# Patient Record
Sex: MALE | Race: ASIAN | NOT HISPANIC OR LATINO | URBAN - METROPOLITAN AREA
[De-identification: names, ages, dates, MRNs, and addresses within clinical notes are randomized per-mention and may not be internally consistent; named-entity substitution may affect disease eponyms.]

---

## 2017-05-16 ENCOUNTER — OFFICE VISIT - HEALTHEAST (OUTPATIENT)
Dept: FAMILY MEDICINE | Facility: CLINIC | Age: 42
End: 2017-05-16

## 2017-05-16 DIAGNOSIS — J02.0 STREP THROAT: ICD-10-CM

## 2017-05-16 DIAGNOSIS — H10.9 RIGHT CONJUNCTIVITIS: ICD-10-CM

## 2017-05-18 ENCOUNTER — OFFICE VISIT - HEALTHEAST (OUTPATIENT)
Dept: FAMILY MEDICINE | Facility: CLINIC | Age: 42
End: 2017-05-18

## 2017-05-18 DIAGNOSIS — H10.33 CONJUNCTIVITIS, ACUTE, BILATERAL: ICD-10-CM

## 2017-11-10 ENCOUNTER — OFFICE VISIT - HEALTHEAST (OUTPATIENT)
Dept: FAMILY MEDICINE | Facility: CLINIC | Age: 42
End: 2017-11-10

## 2017-11-10 DIAGNOSIS — J02.9 SORE THROAT: ICD-10-CM

## 2017-11-10 DIAGNOSIS — R50.9 FEVER: ICD-10-CM

## 2018-09-13 ENCOUNTER — AMBULATORY - HEALTHEAST (OUTPATIENT)
Dept: NURSING | Facility: CLINIC | Age: 43
End: 2018-09-13

## 2018-09-13 DIAGNOSIS — Z23 NEED FOR IMMUNIZATION AGAINST INFLUENZA: ICD-10-CM

## 2019-09-30 ENCOUNTER — OFFICE VISIT - HEALTHEAST (OUTPATIENT)
Dept: FAMILY MEDICINE | Facility: CLINIC | Age: 44
End: 2019-09-30

## 2019-09-30 DIAGNOSIS — R07.0 THROAT PAIN: ICD-10-CM

## 2019-09-30 DIAGNOSIS — J02.9 ACUTE VIRAL PHARYNGITIS: ICD-10-CM

## 2019-09-30 DIAGNOSIS — Z60.3 LANGUAGE BARRIER: ICD-10-CM

## 2019-09-30 DIAGNOSIS — Z75.8 LANGUAGE BARRIER: ICD-10-CM

## 2019-09-30 LAB — DEPRECATED S PYO AG THROAT QL EIA: NORMAL

## 2019-09-30 SDOH — SOCIAL STABILITY - SOCIAL INSECURITY: ACCULTURATION DIFFICULTY: Z60.3

## 2019-10-01 LAB — GROUP A STREP BY PCR: NORMAL

## 2021-05-31 VITALS — WEIGHT: 147.3 LBS

## 2021-05-31 VITALS — WEIGHT: 145.9 LBS

## 2021-05-31 VITALS — WEIGHT: 149.5 LBS

## 2021-06-03 VITALS
SYSTOLIC BLOOD PRESSURE: 117 MMHG | WEIGHT: 151.6 LBS | TEMPERATURE: 97.8 F | OXYGEN SATURATION: 98 % | HEART RATE: 71 BPM | RESPIRATION RATE: 16 BRPM | DIASTOLIC BLOOD PRESSURE: 78 MMHG

## 2021-06-10 NOTE — PROGRESS NOTES
ASSESSMENT:   1. Strep throat  Rapid Strep A Screen-Throat    amoxicillin (AMOXIL) 500 MG capsule   2. Right conjunctivitis  polymyxin B-trimethoprim (FOR POLYTRIM) 10,000 unit- 1 mg/mL Drop ophthalmic drops        PLAN:  Rx: amoxicillin for strep throat.   Change toothbrush in 24 hours.  Contagious for 24 hours after starting antibiotic.  May use tylenol or ibuprofen for fever/discomfort.    F/u with PCP if not improving in 3-5 days, sooner if worsening.       SUBJECTIVE:   Rodrigo Hudson is a 41 y.o. male presents today with 3 days complaint of cough.  Cough is occasionally productive of clear sputum.  He is coughing so hard his chest hurts. He also complains of rhinorrhea, headache, sore throat, low energy and appetite. On the first 2 days of the illness he had a fever up to 101.0.  States he feels his energy, appetite, and cough are improving today. He had a loose stool x 1 on the first day of this illness.   Right eye has been red since waking this morning. Sick contacts: entire family has similar symptoms. His son is being treated with amoxicillin though he is unsure what it is for.  Has tried Christel Frederick with some relief. He does not wear contact lens.     Denies chills, body aches, shortness of breath, vomiting, rash. No injury/trauma to the eye.  No eye drainage or mattering.     Visit conducted with use of indeni interpretor.      There is no problem list on file for this patient.      History   Smoking Status     Never Smoker   Smokeless Tobacco     Never Used       Current Medications:  No current outpatient prescriptions on file prior to visit.     No current facility-administered medications on file prior to visit.        Allergies:   No Known Allergies    OBJECTIVE:   Vitals:    05/16/17 1659   BP: 116/60   Pulse: 87   Resp: 20   Temp: 98.6  F (37  C)   TempSrc: Oral   SpO2: 96%   Weight: 149 lb 8 oz (67.8 kg)     Physical exam reveals a pleasant 41 y.o. male.   Appears healthy, alert, cooperative and  in NAD.  Eyes:  Right conjunctiva mildly erythematous. No drainage or mattering of lashes. Left conjunctiva clear.  PERRLA. EOMs intact. Lids without erythema or edema.   Ears:  normal TMs bilaterally  Nose:    Mucosa normal. Clear rhinorrhea  Mouth: Oral mucosa pink and moist, moderate erythema of tonsils. 2+ tonsillar hypertrophy. No exudate or palatal petechiae. Uvula midline.    Lymph: small anterior cervical LAD  Lungs: Chest is clear, no wheezing, rhonchi or rales. Symmetric air entry throughout both lung fields.  Heart: regular rate and rhythm, no murmur, rub or gallop  Skin: pink, warm, dry, and without lesions on limited skin exam.       Recent Results (from the past 24 hour(s))   Rapid Strep A Screen-Throat   Result Value Ref Range    Rapid Strep A Antigen Group A Strep detected (!) No Group A Strep detected, presumptive negative

## 2021-06-10 NOTE — PROGRESS NOTES
Subjective:      Patient ID: Rodrigo Hudson is a 41 y.o. male.    Chief Complaint:    HPI  Patient comes in for evaluation of red itchy eyes for the last 2 days.  He had come into walk-in care clinic and was given medication for strep pharyngitis and Polytrim for conjunctivitis.  He has been using the eyedrops 4 times a day for the last 2 days and feels like his eyes are getting worse.  They are more red, swollen and itchy.  He has not noticed any vision changes.  He is currently taking amoxicillin for    His strep pharyngitis.    No past medical history on file.    No past surgical history on file.    No family history on file.    Social History   Substance Use Topics     Smoking status: Never Smoker     Smokeless tobacco: Never Used     Alcohol use None       Review of Systems    Objective:     /70  Pulse 85  Temp 98.2  F (36.8  C) (Oral)   Resp 20  Wt 145 lb 14.4 oz (66.2 kg)  SpO2 98%    Physical Exam   Constitutional: No distress.   Eyes:   Both eyes show significantly red and mildly edematous conjunctiva.  Clear discharge noted.  EOMI.  Pupils are 3 mm and symmetrical.  No eyelid swelling and there is no periorbital redness.       Assessment and Plan:     Procedures    The encounter diagnosis was Conjunctivitis, acute, bilateral.     Bilateral conjunctivitis that seems to be worsening because of the Polytrim.  I will have him stop the Polytrim and I gave him a backup prescription for tobramycin that he can fill in 3-5 days if symptoms are not improving.      Patient Instructions     Stop the current eye drops as this may be causing irritation to the eyes.     Continue with the amoxicillin until compete.     I will give you a back up Rx for another eye drop. Fill this Rx in 3-5 days if your eyes are not getting better.

## 2021-06-14 NOTE — PROGRESS NOTES
"Subjective:      Patient ID: Rodrigo Hudson is a 42 y.o. male.    Chief Complaint:    HPI Rodrigo Hudson is a 42 y.o. male who presents today complaining of fever and sore throat x 1 day. He reports his throat pain as tight. Tmax 100. His last dose of Tylenol was yesterday. He took a cough drop this morning and it helped a little bit with the throat tightness. Patient was concerned because last year he had a sore throat, and it ended up being strep. He is a  at a restaurant. He has had a very mild cough with this illness.         Social History   Substance Use Topics     Smoking status: Light Tobacco Smoker     Smokeless tobacco: Never Used      Comment: \"somtimes\"     Alcohol use None       Review of Systems   Constitutional: Positive for fever. Negative for fatigue.   HENT: Positive for sore throat. Negative for congestion, ear pain and rhinorrhea.    Respiratory: Negative for cough.    Skin: Negative for rash.       Objective:     /60 (Patient Site: Right Arm, Patient Position: Sitting, Cuff Size: Adult Regular)  Pulse 88  Temp 98.3  F (36.8  C) (Oral)  Comment: last tylenol dose last night, cough medication this am  Resp 16  Wt 147 lb 4.8 oz (66.8 kg)  SpO2 97%    Physical Exam   Constitutional: He appears well-developed and well-nourished. No distress.   HENT:   Head: Normocephalic and atraumatic.   Right Ear: Tympanic membrane, external ear and ear canal normal.   Left Ear: Tympanic membrane, external ear and ear canal normal.   Nose: Nose normal.   Mouth/Throat: Uvula is midline. Posterior oropharyngeal edema and posterior oropharyngeal erythema present. No oropharyngeal exudate or tonsillar abscesses.   Eyes: Conjunctivae are normal.   Neck: Normal range of motion. Neck supple.   Cardiovascular: Normal rate, regular rhythm and normal heart sounds.  Exam reveals no gallop and no friction rub.    No murmur heard.  Pulmonary/Chest: Effort normal and breath sounds normal. No respiratory " distress. He has no wheezes. He has no rales.   Lymphadenopathy:     He has no cervical adenopathy.   Skin: He is not diaphoretic.   Psychiatric: He has a normal mood and affect. His behavior is normal. Judgment and thought content normal.   Nursing note and vitals reviewed.      Labs:  Recent Results (from the past 24 hour(s))   Rapid Strep A Screen-Throat   Result Value Ref Range    Rapid Strep A Antigen No Group A Strep detected, presumptive negative No Group A Strep detected, presumptive negative       Assessment:     Procedures    1. Sore throat  Rapid Strep A Screen-Throat    Group A Strep, RNA Direct Detection, Throat   2. Fever  Rapid Strep A Screen-Throat    Group A Strep, RNA Direct Detection, Throat         Patient Instructions   1) Increase fluids and rest  2) Continue taking Tylenol for fever relief  3) Salt water gargles and lozenges can be helpful for throat relief  4) You will only be notified of the confirmatory strep results if they are positive.   5) Only return to your work if you are fever free and not coughing.

## 2021-06-17 NOTE — PATIENT INSTRUCTIONS - HE
Patient Instructions by Zehra Mendoza MD at 9/30/2019 12:00 PM     Author: Zehra Mendoza MD Service: -- Author Type: Physician    Filed: 9/30/2019  2:00 PM Encounter Date: 9/30/2019 Status: Addendum    : Zehra Mendoza MD (Physician)    Related Notes: Original Note by Zehra Mendoza MD (Physician) filed at 9/30/2019  2:00 PM       1. Keep well hydrated  2. May alternate Tylenol every 6 hours with ibuprofen every 6 hours as needed for fever or pain  3. If follow up is needed, try and be seen at your primary clinic. Or you can be seen by any primary care provider at one of our other [facility] sites  4. If you have any questions, call the clinic number  - it's answered 24/7  - You will be contacted within the next 48 hours ONLY if the confirmatory strep test is positive.   - Antibiotics will be prescribed if indicated.  - No sharing of food or beverage, until 48 hours is past     Patient Education     Viral Pharyngitis (Sore Throat)    You or your child have pharyngitis (sore throat). This infection is caused by a virus. It can cause throat pain that is worse when swallowing, aching all over, headache, and fever. The infection may be spread by coughing, kissing, or touching others after touching your mouth or nose. Antibiotic medicines do not work against viruses. They are not used for treating this illness.  Home care    If symptoms are severe, you or your child should rest at home. Return to work or school when you or your child feel well enough.     You or your child should drink plenty of fluids to prevent dehydration.    Use throat lozenges or numbing throat sprays to help reduce pain. Gargling with warm salt water will also help reduce throat pain. Dissolve 1/2 teaspoon of salt in 1 glass of warm water. Children can sip on juice or a popsicle. Children 5 years and older can also suck on a lollipop or hard candy.    Dont eat salty or spicy foods or give them to your child. These can be  irritating to the throat.  Medicines for a child: You can give your child acetaminophen for fever, fussiness, or discomfort. In babies over 6 months of age, you may use ibuprofen instead of acetaminophen. If your child has chronic liver or kidney disease or ever had a stomach ulcer or GI bleeding, talk with your [facility] healthcare provider before giving these medicines. Aspirin should never be used by any child under 18 years of age who has a fever. It may cause severe liver damage.  Medicines for an adult: You may use acetaminophen or ibuprofen to control pain or fever, unless another medicine was prescribed for this. If you have chronic liver or kidney disease or ever had a stomach ulcer or GI bleeding, talk with your healthcare provider before using these medicines.  Follow-up care  Follow up with a healthcare provider or our staff if you or your child are not getting better over the next week.  When to seek medical advice  Call your healthcare provider right away if any of these occur:    Fever as directed by your healthcare provider.  For children, seek care if:  ? Your child is of any age and has repeated fevers above 104 F (40 C).  ? Your child is younger than 2 years of age and has a fever of 100.4 F (38 C) for more than 1 day.  ? Your child is 2 years old or older and has a fever of 100.4 F (38 C) for more than 3 days.    New or worsening ear pain, sinus pain, or headache    Painful lumps in the back of neck    Stiff neck    Lymph nodes are getting larger    Cant swallow liquids, a lot of drooling, or cant open mouth wide due to throat pain    Signs of dehydration, such as very dark urine or no urine, sunken eyes, dizziness    Trouble breathing or noisy breathing    Muffled voice    New rash    Other symptoms are getting worse  Date Last Reviewed: 10/1/2017    3965-8511 [employer]. [address] [place] [address], [place] [address]. All rights reserved. This information is not intended as a substitute  for professional medical care. Always follow your healthcare professional's instructions.

## 2021-06-28 NOTE — PROGRESS NOTES
Progress Notes by Zehra Mendoza MD at 9/30/2019 12:00 PM     Author: Zehra Mendoza MD Service: -- Author Type: Physician    Filed: 9/30/2019  2:06 PM Encounter Date: 9/30/2019 Status: Signed    : Zehra Mendoza MD (Physician)         Subjective:   Rodrigo Hudson is a 44 y.o. male  Roomed by: ANIL Dumont      services provided by: Agency     /Agency Name [facility]    Location of  Services: In person      Chief Complaint   Patient presents with   ? Sore Throat     X2 days-- burning sensation    ? Nasal Congestion     X2 days    Admits a cough, but no chills, fever, CP or shortness of breath. Admits a little headache. Admits being able to eat, drink and urinate normally. Denies any recent nausea, vomiting, belly pain, or diarrhea.   Feels a little tired. Admits a runny nose. Denies any ill contacts. Missed work today.   Review of Systems  See HPI for ROS, otherwise balance of other systems negative    No Known Allergies  No current outpatient medications on file.  There is no problem list on file for this patient.    No past medical history on file. - if none on file, see Problem List    Objective:     Vitals:    09/30/19 1330   BP: 117/78   Patient Site: Right Arm   Patient Position: Sitting   Cuff Size: Adult Regular   Pulse: 71   Resp: 16   Temp: 97.8  F (36.6  C)   TempSrc: Oral   SpO2: 98%   Weight: 151 lb 9.6 oz (68.8 kg)   Gen - Pt in NAD  Eyes - Conjunctiva non injected, no drainage  Face - non TTP over frontal sinus areas; non TTP over maxillary areas  Ears - external canals - no induration, Right TM - not injected, Left TM - not injected   Nose - not congested, no nasal drainage  Pharynx - not injected, tonsils 1+ size  Neck - supple, no cervical adenopathy, no masses  Cor - RRR w/o murmur  Lungs - Good air entry; no wheezes or crackles noted on auscultation - no coughing noted  Skin - no lesions, no rashes noted    Results for  orders placed or performed in visit on 09/30/19   Rapid Strep A Screen-Throat swab   Result Value Ref Range    Rapid Strep A Antigen No Group A Strep detected, presumptive negative No Group A Strep detected, presumptive negative   Lab result discussed on day of visit.     Assessment - Plan   Medical Decision Making - No clinical findings indicative of bacterial infection requiring antibiotics, such as pneumonia, sinusitis or otitis media were ascertained from today's evaluation. RS was negative. Presentation and clinical findings are consistent with a viral process. Symptomatic management and when to follow up discussed as described in Patient Instructions.     1. Acute viral pharyngitis  - Group A Strep, RNA Direct Detection, Throat    2. Throat pain  - Rapid Strep A Screen-Throat swab  - alum/mag hydrox-simethicone-diphenhydramine-lidocaine (MAGIC MOUTHWASH) suspension; Gargle and spit out 15 cc ( 1 tablespoon) every 4-6 hours, but no more than 4 doses in 24 hours  Dispense: 120 mL; Refill: 0    3. Language barrier    At the conclusion of the encounter, assessment and plan were discussed.   All questions were answered.   The patient or guardian acknowledged understanding and was involved in the decision making regarding the overall care plan.    Patient Instructions   1. Keep well hydrated  2. May alternate Tylenol every 6 hours with ibuprofen every 6 hours as needed for fever or pain  3. If follow up is needed, try and be seen at your primary clinic. Or you can be seen by any primary care provider at one of our other [facility] sites  4. If you have any questions, call the clinic number  - it's answered 24/7  - You will be contacted within the next 48 hours ONLY if the confirmatory strep test is positive.   - Antibiotics will be prescribed if indicated.  - No sharing of food or beverage, until 48 hours is past     Patient Education     Viral Pharyngitis (Sore Throat)    You or your child have pharyngitis (sore  throat). This infection is caused by a virus. It can cause throat pain that is worse when swallowing, aching all over, headache, and fever. The infection may be spread by coughing, kissing, or touching others after touching your mouth or nose. Antibiotic medicines do not work against viruses. They are not used for treating this illness.  Home care    If symptoms are severe, you or your child should rest at home. Return to work or school when you or your child feel well enough.     You or your child should drink plenty of fluids to prevent dehydration.    Use throat lozenges or numbing throat sprays to help reduce pain. Gargling with warm salt water will also help reduce throat pain. Dissolve 1/2 teaspoon of salt in 1 glass of warm water. Children can sip on juice or a popsicle. Children 5 years and older can also suck on a lollipop or hard candy.    Dont eat salty or spicy foods or give them to your child. These can be irritating to the throat.  Medicines for a child: You can give your child acetaminophen for fever, fussiness, or discomfort. In babies over 6 months of age, you may use ibuprofen instead of acetaminophen. If your child has chronic liver or kidney disease or ever had a stomach ulcer or GI bleeding, talk with your [facility] healthcare provider before giving these medicines. Aspirin should never be used by any child under 18 years of age who has a fever. It may cause severe liver damage.  Medicines for an adult: You may use acetaminophen or ibuprofen to control pain or fever, unless another medicine was prescribed for this. If you have chronic liver or kidney disease or ever had a stomach ulcer or GI bleeding, talk with your healthcare provider before using these medicines.  Follow-up care  Follow up with a healthcare provider or our staff if you or your child are not getting better over the next week.  When to seek medical advice  Call your healthcare provider right away if any of these occur:    Fever as  directed by your healthcare provider.  For children, seek care if:  ? Your child is of any age and has repeated fevers above 104 F (40 C).  ? Your child is younger than 2 years of age and has a fever of 100.4 F (38 C) for more than 1 day.  ? Your child is 2 years old or older and has a fever of 100.4 F (38 C) for more than 3 days.    New or worsening ear pain, sinus pain, or headache    Painful lumps in the back of neck    Stiff neck    Lymph nodes are getting larger    Cant swallow liquids, a lot of drooling, or cant open mouth wide due to throat pain    Signs of dehydration, such as very dark urine or no urine, sunken eyes, dizziness    Trouble breathing or noisy breathing    Muffled voice    New rash    Other symptoms are getting worse  Date Last Reviewed: 10/1/2017    [id number] [employer]. [address] [place] [address]. All rights reserved. This information is not intended as a substitute for professional medical care. Always follow your healthcare professional's instructions.